# Patient Record
Sex: FEMALE | Race: WHITE
[De-identification: names, ages, dates, MRNs, and addresses within clinical notes are randomized per-mention and may not be internally consistent; named-entity substitution may affect disease eponyms.]

---

## 2019-04-19 ENCOUNTER — HOSPITAL ENCOUNTER (INPATIENT)
Dept: HOSPITAL 96 - M.ERS | Age: 69
LOS: 4 days | DRG: 871 | End: 2019-04-23
Attending: INTERNAL MEDICINE | Admitting: INTERNAL MEDICINE
Payer: COMMERCIAL

## 2019-04-19 VITALS — DIASTOLIC BLOOD PRESSURE: 72 MMHG | SYSTOLIC BLOOD PRESSURE: 108 MMHG

## 2019-04-19 VITALS — DIASTOLIC BLOOD PRESSURE: 80 MMHG | SYSTOLIC BLOOD PRESSURE: 111 MMHG

## 2019-04-19 VITALS — SYSTOLIC BLOOD PRESSURE: 107 MMHG | DIASTOLIC BLOOD PRESSURE: 77 MMHG

## 2019-04-19 VITALS — SYSTOLIC BLOOD PRESSURE: 106 MMHG | DIASTOLIC BLOOD PRESSURE: 71 MMHG

## 2019-04-19 VITALS — SYSTOLIC BLOOD PRESSURE: 121 MMHG | DIASTOLIC BLOOD PRESSURE: 78 MMHG

## 2019-04-19 VITALS — DIASTOLIC BLOOD PRESSURE: 85 MMHG | SYSTOLIC BLOOD PRESSURE: 119 MMHG

## 2019-04-19 VITALS — SYSTOLIC BLOOD PRESSURE: 115 MMHG | DIASTOLIC BLOOD PRESSURE: 75 MMHG

## 2019-04-19 VITALS — HEIGHT: 61 IN | WEIGHT: 115.3 LBS | BODY MASS INDEX: 21.77 KG/M2

## 2019-04-19 VITALS — DIASTOLIC BLOOD PRESSURE: 90 MMHG | SYSTOLIC BLOOD PRESSURE: 120 MMHG

## 2019-04-19 DIAGNOSIS — Z51.5: ICD-10-CM

## 2019-04-19 DIAGNOSIS — M19.90: ICD-10-CM

## 2019-04-19 DIAGNOSIS — F17.210: ICD-10-CM
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
## 2019-04-19 DIAGNOSIS — I11.0: ICD-10-CM

## 2019-04-19 DIAGNOSIS — J69.0: ICD-10-CM

## 2019-04-19 DIAGNOSIS — F10.239: ICD-10-CM

## 2019-04-19 DIAGNOSIS — Z85.21: ICD-10-CM

## 2019-04-19 DIAGNOSIS — A41.9: Primary | ICD-10-CM

## 2019-04-19 DIAGNOSIS — Z90.710: ICD-10-CM

## 2019-04-19 DIAGNOSIS — G43.909: ICD-10-CM

## 2019-04-19 DIAGNOSIS — Z79.82: ICD-10-CM

## 2019-04-19 DIAGNOSIS — Z90.49: ICD-10-CM

## 2019-04-19 DIAGNOSIS — I50.9: ICD-10-CM

## 2019-04-19 DIAGNOSIS — Z88.1: ICD-10-CM

## 2019-04-19 DIAGNOSIS — E43: ICD-10-CM

## 2019-04-19 DIAGNOSIS — R65.20: ICD-10-CM

## 2019-04-19 DIAGNOSIS — H91.90: ICD-10-CM

## 2019-04-19 DIAGNOSIS — J96.02: ICD-10-CM

## 2019-04-19 DIAGNOSIS — J96.01: ICD-10-CM

## 2019-04-19 DIAGNOSIS — L98.9: ICD-10-CM

## 2019-04-19 DIAGNOSIS — Z86.73: ICD-10-CM

## 2019-04-19 DIAGNOSIS — I21.4: ICD-10-CM

## 2019-04-19 DIAGNOSIS — J43.9: ICD-10-CM

## 2019-04-19 DIAGNOSIS — F10.20: ICD-10-CM

## 2019-04-19 LAB
ABSOLUTE MONOCYTES: 0.7 THOU/UL (ref 0–1.2)
ALBUMIN SERPL-MCNC: 3.1 G/DL (ref 3.4–5)
ALP SERPL-CCNC: 135 U/L (ref 46–116)
ALT SERPL-CCNC: 42 U/L (ref 30–65)
ANION GAP SERPL CALC-SCNC: 15 MMOL/L (ref 7–16)
APAP SERPL-MCNC: < 2 UG/ML (ref 10–30)
APTT BLD: 28 SECONDS (ref 25–31.3)
AST SERPL-CCNC: 67 U/L (ref 15–37)
BE: -11.3 MMOL/L
BE: -12.9 MMOL/L
BILIRUB SERPL-MCNC: 0.3 MG/DL
BUN SERPL-MCNC: 10 MG/DL (ref 7–18)
CALCIUM SERPL-MCNC: 8.9 MG/DL (ref 8.5–10.1)
CHLORIDE SERPL-SCNC: 98 MMOL/L (ref 98–107)
CO2 SERPL-SCNC: 20 MMOL/L (ref 21–32)
CREAT SERPL-MCNC: 1 MG/DL (ref 0.6–1.3)
ETHANOL SERPL-MCNC: 27 MG/DL (ref ?–10)
GLUCOSE SERPL-MCNC: 223 MG/DL (ref 70–99)
GRANULOCYTES NFR BLD MANUAL: 86 %
HCT VFR BLD CALC: 42.8 % (ref 37–47)
HGB BLD-MCNC: 14.1 GM/DL (ref 12–15)
INR PPP: 1.1
LYMPHOCYTES # BLD: 0.7 THOU/UL (ref 0.8–5.3)
LYMPHOCYTES NFR BLD AUTO: 4 %
MCH RBC QN AUTO: 33.8 PG (ref 26–34)
MCHC RBC AUTO-ENTMCNC: 33 G/DL (ref 28–37)
MCV RBC: 102.2 FL (ref 80–100)
MONOCYTES NFR BLD: 4 %
MPV: 6.7 FL. (ref 7.2–11.1)
NEUTROPHILS # BLD: 17.2 THOU/UL (ref 1.6–8.1)
NEUTS BAND NFR BLD: 6 %
NT-PRO BRAIN NAT PEPTIDE: 785 PG/ML (ref ?–300)
NUCLEATED RBCS: 0 /100WBC
PCO2 BLD: 44.5 MMHG (ref 35–45)
PCO2 BLD: 55.2 MMHG (ref 35–45)
PLATELET # BLD EST: ADEQUATE 10*3/UL
PLATELET COUNT*: 356 THOU/UL (ref 150–400)
PO2 BLD: 102.1 MMHG (ref 75–100)
PO2 BLD: 85.9 MMHG (ref 75–100)
POTASSIUM SERPL-SCNC: 4.8 MMOL/L (ref 3.5–5.1)
PROT SERPL-MCNC: 7.1 G/DL (ref 6.4–8.2)
PROTHROMBIN TIME: 11.5 SECONDS (ref 9.2–11.5)
RBC # BLD AUTO: 4.19 MIL/UL (ref 4.2–5)
RBC MORPH BLD: NORMAL
RDW-CV: 12.9 % (ref 10.5–14.5)
SALICYLATES SERPL-MCNC: 3.2 MG/DL (ref 2.8–20)
SODIUM SERPL-SCNC: 133 MMOL/L (ref 136–145)
TROPONIN-I LEVEL: 1.46 NG/ML (ref ?–0.06)
WBC # BLD AUTO: 18.7 THOU/UL (ref 4–11)

## 2019-04-19 NOTE — NUR
CALLED AND UPDATED DR RUBI RECENT ABG RESULTS, CLARIFIED HEPARIN BOLUS PER
PRTOCOL TO BE 2730UNITS IV BOLUS. GIVE ONE AMP BICARB IVP, DC NS IV FLUID,
BEGIN SODIUM BICARB GTT IV, BIPAP 12/5, REPEAT ABGIN 4 HOURS, REPEAT BMP, MAG
AND TROP IN 4 HOURS, WILL COMMUNICATE NEW ORDERS WITH PATIENT AND INITATE AS
ORDERED.

## 2019-04-19 NOTE — NUR
THIS NURSE ATTEMPTED SECOND IV LINE. UNABLE TO OBTAIN ACCESS. IV X2 BLOWN.
GAUZE AND BANDAID APPLIED TO BOTH SITES. WARM BLANKETS WRAPPED AROUND ARMS.

## 2019-04-19 NOTE — NUR
PT BACK FROM CT SCAN VIA STRETCHER BY THIS NURSE. DR. KAMINSKI STATES NEED TO
INTUBATE. PT INFORMED, PT REFUSING INTUBATION. PT A&O X4. HOSPITALIST AT
BEDSIDE STATING TO HOLD OFF ON INTUBATION. PT BREATHING LABORED AND SHALLOW. PT
ASKING FOR INHALER. PT ASKED VIA WRITTEN QUESTION IF SHE WOULD WANT STAFF TO
PUMP ON CHEST IF HER HEART STOPPED, PT STATED "YEAH". WITNESSED BY WALT STORY.

## 2019-04-19 NOTE — NUR
CRITICAL SERUM TROP LEVEL 2.12, HAS BEEN TREDING UP SINCE ARRIVAL TO HOSPITAL.
CALLED AND SPOKE WITH DR RUBI, COMMUNICATED TRENDING UP SERUM TROPONIN
LEVELS, WITH LAST RESULT 2.12, NEW ORDERS OBTAINED HEPARIN 5000UNITS BOLUS IV,
BEGIN HEPARIN CARDIOLOGY GTT PER PROTOCOL, START PPN 40CC/HR DECRESE NS TO
60CC/HR AND ATIVAN 1MG IVP [id number] PRN FOR AGGITATION. AWAITING 2000 ABG TO BE
DRAWN, PER DR RUBI REQUEST WILL NOTIFIY AGAIN WITH ABG RESULTS.

## 2019-04-19 NOTE — NUR
PT STEP DAUGHTER JEZ CALLED. PT STATED IT WAS OKAY TO GIVE JEZ INFORMATION
ABOUT PT CARE. JEZ REPORTS THAT PT LIVES IN [facility] AND TODAY WHEN JEZ WENT
TO DROP MAIL OFF WITH PT. JEZ FOUND THE PT AT THE BOTTOM OF THE STAIRS. PT
TOLD JEZ THAT SHE WAS HAVING A STROKE, SO JEZ CALLED THE AMBULANCE. JEZ
REPORTS THAT THERE WAS "A LOT OF BEER CANS ALL OVER THE PLACE". JEZ REPORTS
THAT PT HAS POOR LIVING CONDITIONS AND DOES NOT WASH SELF AS NECESSARY. JEZ
STATED THAT HER AND HER  WILL COME VISIT LATER.

## 2019-04-19 NOTE — NUR
NURSE TO NURSE REPORT GIVEN TO AWLT CHAVEZ IN ICU. SCOTT TO ASSUME PT CARE. 
PT TO BE TRANSFERRED TO ICU
BED 1 VIA STRETCHER.

## 2019-04-19 NOTE — NUR
PT MOVED TO ROOM 1 IN ER FROM ROOM 15 IN ER. WHEN PT ARRIVED TO ROOM 1, BEAR
HUGGER WAS PLACED DUE TO TEMPORAL TEMP RECORDED AT 95 F.

## 2019-04-19 NOTE — NUR
PT ARRIVED FROM ER AROUND 1700 TO ICU RM 1. ASSESSMENT AS CHARTED. PT HAS
WARMING BLANKET ON WITH TEMP FONSECA PROBE THAT READS IN 94.8 F. PT Cahto AND NOT
ABLE TO ANSWER QUESTIONS. WILL CONTINUE TO MONITOR.

## 2019-04-20 VITALS — DIASTOLIC BLOOD PRESSURE: 77 MMHG | SYSTOLIC BLOOD PRESSURE: 123 MMHG

## 2019-04-20 VITALS — DIASTOLIC BLOOD PRESSURE: 59 MMHG | SYSTOLIC BLOOD PRESSURE: 90 MMHG

## 2019-04-20 VITALS — SYSTOLIC BLOOD PRESSURE: 91 MMHG | DIASTOLIC BLOOD PRESSURE: 55 MMHG

## 2019-04-20 VITALS — SYSTOLIC BLOOD PRESSURE: 80 MMHG | DIASTOLIC BLOOD PRESSURE: 46 MMHG

## 2019-04-20 VITALS — SYSTOLIC BLOOD PRESSURE: 82 MMHG | DIASTOLIC BLOOD PRESSURE: 52 MMHG

## 2019-04-20 VITALS — DIASTOLIC BLOOD PRESSURE: 75 MMHG | SYSTOLIC BLOOD PRESSURE: 118 MMHG

## 2019-04-20 VITALS — SYSTOLIC BLOOD PRESSURE: 104 MMHG | DIASTOLIC BLOOD PRESSURE: 64 MMHG

## 2019-04-20 VITALS — DIASTOLIC BLOOD PRESSURE: 94 MMHG | SYSTOLIC BLOOD PRESSURE: 128 MMHG

## 2019-04-20 VITALS — DIASTOLIC BLOOD PRESSURE: 76 MMHG | SYSTOLIC BLOOD PRESSURE: 110 MMHG

## 2019-04-20 VITALS — DIASTOLIC BLOOD PRESSURE: 45 MMHG | SYSTOLIC BLOOD PRESSURE: 78 MMHG

## 2019-04-20 VITALS — SYSTOLIC BLOOD PRESSURE: 113 MMHG | DIASTOLIC BLOOD PRESSURE: 73 MMHG

## 2019-04-20 VITALS — DIASTOLIC BLOOD PRESSURE: 56 MMHG | SYSTOLIC BLOOD PRESSURE: 89 MMHG

## 2019-04-20 VITALS — DIASTOLIC BLOOD PRESSURE: 133 MMHG | SYSTOLIC BLOOD PRESSURE: 157 MMHG

## 2019-04-20 VITALS — DIASTOLIC BLOOD PRESSURE: 81 MMHG | SYSTOLIC BLOOD PRESSURE: 109 MMHG

## 2019-04-20 VITALS — SYSTOLIC BLOOD PRESSURE: 91 MMHG | DIASTOLIC BLOOD PRESSURE: 54 MMHG

## 2019-04-20 VITALS — SYSTOLIC BLOOD PRESSURE: 93 MMHG | DIASTOLIC BLOOD PRESSURE: 28 MMHG

## 2019-04-20 VITALS — SYSTOLIC BLOOD PRESSURE: 157 MMHG | DIASTOLIC BLOOD PRESSURE: 75 MMHG

## 2019-04-20 VITALS — SYSTOLIC BLOOD PRESSURE: 132 MMHG | DIASTOLIC BLOOD PRESSURE: 90 MMHG

## 2019-04-20 VITALS — SYSTOLIC BLOOD PRESSURE: 98 MMHG | DIASTOLIC BLOOD PRESSURE: 73 MMHG

## 2019-04-20 VITALS — SYSTOLIC BLOOD PRESSURE: 105 MMHG | DIASTOLIC BLOOD PRESSURE: 65 MMHG

## 2019-04-20 VITALS — SYSTOLIC BLOOD PRESSURE: 97 MMHG | DIASTOLIC BLOOD PRESSURE: 61 MMHG

## 2019-04-20 VITALS — SYSTOLIC BLOOD PRESSURE: 129 MMHG | DIASTOLIC BLOOD PRESSURE: 99 MMHG

## 2019-04-20 VITALS — DIASTOLIC BLOOD PRESSURE: 61 MMHG | SYSTOLIC BLOOD PRESSURE: 99 MMHG

## 2019-04-20 VITALS — SYSTOLIC BLOOD PRESSURE: 93 MMHG | DIASTOLIC BLOOD PRESSURE: 57 MMHG

## 2019-04-20 VITALS — SYSTOLIC BLOOD PRESSURE: 91 MMHG | DIASTOLIC BLOOD PRESSURE: 52 MMHG

## 2019-04-20 VITALS — DIASTOLIC BLOOD PRESSURE: 56 MMHG | SYSTOLIC BLOOD PRESSURE: 94 MMHG

## 2019-04-20 VITALS — SYSTOLIC BLOOD PRESSURE: 96 MMHG | DIASTOLIC BLOOD PRESSURE: 58 MMHG

## 2019-04-20 VITALS — DIASTOLIC BLOOD PRESSURE: 96 MMHG | SYSTOLIC BLOOD PRESSURE: 134 MMHG

## 2019-04-20 VITALS — SYSTOLIC BLOOD PRESSURE: 105 MMHG | DIASTOLIC BLOOD PRESSURE: 69 MMHG

## 2019-04-20 VITALS — DIASTOLIC BLOOD PRESSURE: 74 MMHG | SYSTOLIC BLOOD PRESSURE: 109 MMHG

## 2019-04-20 VITALS — SYSTOLIC BLOOD PRESSURE: 85 MMHG | DIASTOLIC BLOOD PRESSURE: 57 MMHG

## 2019-04-20 VITALS — DIASTOLIC BLOOD PRESSURE: 89 MMHG | SYSTOLIC BLOOD PRESSURE: 139 MMHG

## 2019-04-20 VITALS — SYSTOLIC BLOOD PRESSURE: 98 MMHG | DIASTOLIC BLOOD PRESSURE: 68 MMHG

## 2019-04-20 VITALS — SYSTOLIC BLOOD PRESSURE: 123 MMHG | DIASTOLIC BLOOD PRESSURE: 90 MMHG

## 2019-04-20 VITALS — SYSTOLIC BLOOD PRESSURE: 119 MMHG | DIASTOLIC BLOOD PRESSURE: 76 MMHG

## 2019-04-20 VITALS — SYSTOLIC BLOOD PRESSURE: 89 MMHG | DIASTOLIC BLOOD PRESSURE: 67 MMHG

## 2019-04-20 VITALS — SYSTOLIC BLOOD PRESSURE: 97 MMHG | DIASTOLIC BLOOD PRESSURE: 45 MMHG

## 2019-04-20 VITALS — DIASTOLIC BLOOD PRESSURE: 96 MMHG | SYSTOLIC BLOOD PRESSURE: 125 MMHG

## 2019-04-20 VITALS — SYSTOLIC BLOOD PRESSURE: 95 MMHG | DIASTOLIC BLOOD PRESSURE: 53 MMHG

## 2019-04-20 VITALS — DIASTOLIC BLOOD PRESSURE: 50 MMHG | SYSTOLIC BLOOD PRESSURE: 83 MMHG

## 2019-04-20 VITALS — SYSTOLIC BLOOD PRESSURE: 117 MMHG | DIASTOLIC BLOOD PRESSURE: 78 MMHG

## 2019-04-20 VITALS — DIASTOLIC BLOOD PRESSURE: 43 MMHG | SYSTOLIC BLOOD PRESSURE: 73 MMHG

## 2019-04-20 LAB
ABSOLUTE BASOPHILS: 0 THOU/UL (ref 0–0.2)
ABSOLUTE EOSINOPHILS: 0 THOU/UL (ref 0–0.7)
ABSOLUTE MONOCYTES: 0.8 THOU/UL (ref 0–1.2)
ALBUMIN SERPL-MCNC: 2.6 G/DL (ref 3.4–5)
ALP SERPL-CCNC: 75 U/L (ref 46–116)
ALT SERPL-CCNC: 59 U/L (ref 30–65)
ANION GAP SERPL CALC-SCNC: 10 MMOL/L (ref 7–16)
ANION GAP SERPL CALC-SCNC: 9 MMOL/L (ref 7–16)
AST SERPL-CCNC: 200 U/L (ref 15–37)
BASOPHILS NFR BLD AUTO: 0.3 %
BE: -1.2 MMOL/L
BE: -1.8 MMOL/L
BE: -3.6 MMOL/L
BE: -4.6 MMOL/L
BE: -7.9 MMOL/L
BILIRUB SERPL-MCNC: 0.6 MG/DL
BUN SERPL-MCNC: 22 MG/DL (ref 7–18)
BUN SERPL-MCNC: 28 MG/DL (ref 7–18)
CALCIUM SERPL-MCNC: 8.1 MG/DL (ref 8.5–10.1)
CALCIUM SERPL-MCNC: 8.2 MG/DL (ref 8.5–10.1)
CHLORIDE SERPL-SCNC: 103 MMOL/L (ref 98–107)
CHLORIDE SERPL-SCNC: 103 MMOL/L (ref 98–107)
CO2 SERPL-SCNC: 24 MMOL/L (ref 21–32)
CO2 SERPL-SCNC: 26 MMOL/L (ref 21–32)
CREAT SERPL-MCNC: 0.9 MG/DL (ref 0.6–1.3)
CREAT SERPL-MCNC: 1.1 MG/DL (ref 0.6–1.3)
EOSINOPHIL NFR BLD: 0 %
GLUCOSE SERPL-MCNC: 192 MG/DL (ref 70–99)
GLUCOSE SERPL-MCNC: 199 MG/DL (ref 70–99)
GRANULOCYTES NFR BLD MANUAL: 88 %
HCT VFR BLD CALC: 38.7 % (ref 37–47)
HGB BLD-MCNC: 13 GM/DL (ref 12–15)
LYMPHOCYTES # BLD: 0.6 THOU/UL (ref 0.8–5.3)
LYMPHOCYTES NFR BLD AUTO: 4.8 %
MAGNESIUM SERPL-MCNC: 2.2 MG/DL (ref 1.8–2.4)
MAGNESIUM SERPL-MCNC: 2.3 MG/DL (ref 1.8–2.4)
MCH RBC QN AUTO: 34.1 PG (ref 26–34)
MCHC RBC AUTO-ENTMCNC: 33.5 G/DL (ref 28–37)
MCV RBC: 101.6 FL (ref 80–100)
MONOCYTES NFR BLD: 6.9 %
MPV: 7.7 FL. (ref 7.2–11.1)
NEUTROPHILS # BLD: 10.7 THOU/UL (ref 1.6–8.1)
NUCLEATED RBCS: 0 /100WBC
OPIATES UR-MCNC: POSITIVE NG/ML
PCO2 BLD: 43.1 MMHG (ref 35–45)
PCO2 BLD: 50.5 MMHG (ref 35–45)
PCO2 BLD: 55.2 MMHG (ref 35–45)
PCO2 BLD: 68 MMHG (ref 35–45)
PCO2 BLD: 77 MMHG (ref 35–45)
PLATELET COUNT*: 244 THOU/UL (ref 150–400)
PO2 BLD: 152.5 MMHG (ref 75–100)
PO2 BLD: 215.7 MMHG (ref 75–100)
PO2 BLD: 69.6 MMHG (ref 75–100)
PO2 BLD: 78.1 MMHG (ref 75–100)
PO2 BLD: 79.4 MMHG (ref 75–100)
POTASSIUM SERPL-SCNC: 4.4 MMOL/L (ref 3.5–5.1)
POTASSIUM SERPL-SCNC: 5.6 MMOL/L (ref 3.5–5.1)
PROT SERPL-MCNC: 5.8 G/DL (ref 6.4–8.2)
RBC # BLD AUTO: 3.81 MIL/UL (ref 4.2–5)
RDW-CV: 12.7 % (ref 10.5–14.5)
SODIUM SERPL-SCNC: 136 MMOL/L (ref 136–145)
SODIUM SERPL-SCNC: 139 MMOL/L (ref 136–145)
TROPONIN-I LEVEL: 3.59 NG/ML (ref ?–0.06)
WBC # BLD AUTO: 12.2 THOU/UL (ref 4–11)

## 2019-04-20 PROCEDURE — 5A1945Z RESPIRATORY VENTILATION, 24-96 CONSECUTIVE HOURS: ICD-10-PCS | Performed by: INTERNAL MEDICINE

## 2019-04-20 PROCEDURE — 02HV33Z INSERTION OF INFUSION DEVICE INTO SUPERIOR VENA CAVA, PERCUTANEOUS APPROACH: ICD-10-PCS | Performed by: INTERNAL MEDICINE

## 2019-04-20 PROCEDURE — 5A09357 ASSISTANCE WITH RESPIRATORY VENTILATION, LESS THAN 24 CONSECUTIVE HOURS, CONTINUOUS POSITIVE AIRWAY PRESSURE: ICD-10-PCS | Performed by: INTERNAL MEDICINE

## 2019-04-20 PROCEDURE — 0BH17EZ INSERTION OF ENDOTRACHEAL AIRWAY INTO TRACHEA, VIA NATURAL OR ARTIFICIAL OPENING: ICD-10-PCS | Performed by: INTERNAL MEDICINE

## 2019-04-20 NOTE — NUR
PROGRESSING TOWARDS GOALS, SEE COMPUTERIZED ASSESSMENT DOCUMENTATION PLEASE.
ABG'S IMPROVING, REMAINS ON NA BICARB GTT 60CC/HR AND BIPAP FIO2 50%, SAO2
=>97%, YANNICK HUGGER REMOVED ONCE CORE TEMP OBTAINED AT 97.0, ABLE TO MAINTAIN
TEMP REST OF NOC, CORE TEMP 99.0 AT THIS TIME, REMAINS ON HEPARIN GTT WITHOUT
S/S OF ACTIVE BLEEDING NOTED, DENIES CP OR DISCOMFORT, REMAINS NSR/ST TRACING
CARDIAC MONITOR, ATIVAN GIVEN X1 AND HELPFUL FOR RELAXATION, REMAINS
TACHYPENIC RESP HIGH 20'S TO HIGH 30'S, REPOSITIONED Q2 AND PRN TO PROMOTE
SKIN INTEGRITY AND COMFORT, BED ALARM ON FOR SAFETY, BED REMAINS IN LOW AND
LOCKED POSITION.

## 2019-04-20 NOTE — NUR
APTT 59.6, IN THERAPEUTIC RANGE, NEW APTT ENTERED IN COMPUTER FOR 0/21/19 AT
0300. NO S/S OF ACTIVE BLEEDING NOTED.

## 2019-04-20 NOTE — NUR
PATIENT REMAINS ON BIPAP WITH O2 SAT AT 94%. NOW NSR ON CARDIAC MONITOR.
ELEVATED TROPONINS READ TO DR LACEY ON FLOOR. ORDERS FOR REPEAT TROPONIN AT
1900 AND EKG IN AM. REMAINS ON PRECEDEX AT 1.4, 2X DOSES OF ATIVAN GIVEN
TODAY. PATIENT'S DAUGHTER BROUGHT DPOA/ADVANCED DIRECTIVE. PATIENT'S 
 IS PRIMARY DPOA, BUT SECONDARY DPOA IS STEP-SON. ADVANCED DIRECTIVE
REPORTS THAT SHE WOULD LIKE NO LIFE PROLONGING INTERVENTIONS, BUT CURRENTLY
SHE IS SAYING SHE WOULD BE OKAY WITH INTUBATION. PER HOSPITALIST FULL CODE
ORDERED. PATIENT WAS ON HOSPICE AFTER LAST STAY IN THIS HOSPITAL PER FAMILY,
BUT DISCONTINUED IT HERSELF. SHE CURRENTLY LIVES IN HER OWN HOME. HER STEP
DAUGHTER AND STEP SON IN LAW LIVE THREE HOUSES DOWN AND BRING HER
FOOD, BUT SHE REQUESTS THEM TO BRING CIGARETTES AND BEER. PER STEP DAUGHTER
SHE DRINKS 5-6 16 OUNCE BEERS DAILY AND 2 CARTONS CIGARETTES A WEEK. PATIENT
SMOKES IN THE GARAGE WHERE SHE WAS FOUND TO HAVE FALLEN. PATIENT UNKEMPT AND
COVERED IN A LAYER OF DIRT. BATH GIVEN TODAY, BUT UNABLE TO GET MOST OF THE
GRIME OFF. PATIENT DOES NOT GO TO DOCTOR'S VISITS EXCEPT TO GET HER INHALER
SCRIPT. SHE HAS HAD TROUBLE SWALLOING, BUT DOES NOT LET THE PHYSICIAN
EVALUATE. SHE HAS BEEN WRITING ON PAPER TO COMMUNICATE WITH FAMILY R/T HOARSE
VOICE. SHE ALSO HAS CANCEROUS-LIKE LESION TO FOREHEAD, HER FAMILY STATES SHE
HAS HAD FOR 3 YEARS, BUT HAS NOT LET PHYSICIAN EVALUATE AS WELL. PATIENT
FAMILY APPEARS COOPERATIVE WITH [facility]. VOICES NO FURTHER QUESTIONS AT THIS
TIME.

## 2019-04-20 NOTE — NUR
PATIENT WORK OF BREATHING WORSENING. PULMONARY PAGED. DR PORTER GAVE ORDERS FOR
INTUBATION. ETOMIDATE AT 1859, SUCC AT 1859. INTUBATED AT 1900. COLOR CHANGE
NOTED. PER DR PORTER, VENT SETTINGS AC 14, , PEEP 5, FIO2 100%. ABG TO BE
COMPLETED 30 MINUTES AFTER. CXR CONFIRMED PLACEMENT. NG PLACED LEFT NARE. KUB
PENDING. VEC GIVEN AT 1900. VERSED GTT STARTED. LEVOPHED AS NEEDED. PATIENT'S
STEP-DAUGHTER NOTIFIED. SHE STATED THAT SHE BROUGHT THE ADVANCED DIRECTIVE
THAT STATED PATIENT WAS A DNR. PATIENT VERBALIZED TO PHYSICIAN THAT SHE WOULD
WANT A "TUBE DOWN HER THROAT" IF HER BREATHING WORSENED AND PHYSICIAN HAD
ORDERED FULL CODE. DID HAVE EXTENSIVE CONVERSATION WITH THE STEP-DAUGHTER
EARLIER THAT PATIENT WAS ORIENTED AND "DRIVING THE SHIP" AT THIS MOMENT.

## 2019-04-21 VITALS — SYSTOLIC BLOOD PRESSURE: 116 MMHG | DIASTOLIC BLOOD PRESSURE: 77 MMHG

## 2019-04-21 VITALS — SYSTOLIC BLOOD PRESSURE: 126 MMHG | DIASTOLIC BLOOD PRESSURE: 81 MMHG

## 2019-04-21 VITALS — SYSTOLIC BLOOD PRESSURE: 113 MMHG | DIASTOLIC BLOOD PRESSURE: 72 MMHG

## 2019-04-21 VITALS — SYSTOLIC BLOOD PRESSURE: 110 MMHG | DIASTOLIC BLOOD PRESSURE: 73 MMHG

## 2019-04-21 VITALS — DIASTOLIC BLOOD PRESSURE: 65 MMHG | SYSTOLIC BLOOD PRESSURE: 109 MMHG

## 2019-04-21 VITALS — DIASTOLIC BLOOD PRESSURE: 72 MMHG | SYSTOLIC BLOOD PRESSURE: 113 MMHG

## 2019-04-21 VITALS — DIASTOLIC BLOOD PRESSURE: 73 MMHG | SYSTOLIC BLOOD PRESSURE: 110 MMHG

## 2019-04-21 VITALS — SYSTOLIC BLOOD PRESSURE: 79 MMHG | DIASTOLIC BLOOD PRESSURE: 53 MMHG

## 2019-04-21 VITALS — SYSTOLIC BLOOD PRESSURE: 113 MMHG | DIASTOLIC BLOOD PRESSURE: 73 MMHG

## 2019-04-21 VITALS — DIASTOLIC BLOOD PRESSURE: 71 MMHG | SYSTOLIC BLOOD PRESSURE: 111 MMHG

## 2019-04-21 VITALS — DIASTOLIC BLOOD PRESSURE: 70 MMHG | SYSTOLIC BLOOD PRESSURE: 113 MMHG

## 2019-04-21 VITALS — DIASTOLIC BLOOD PRESSURE: 70 MMHG | SYSTOLIC BLOOD PRESSURE: 114 MMHG

## 2019-04-21 VITALS — DIASTOLIC BLOOD PRESSURE: 68 MMHG | SYSTOLIC BLOOD PRESSURE: 109 MMHG

## 2019-04-21 VITALS — DIASTOLIC BLOOD PRESSURE: 69 MMHG | SYSTOLIC BLOOD PRESSURE: 110 MMHG

## 2019-04-21 VITALS — SYSTOLIC BLOOD PRESSURE: 119 MMHG | DIASTOLIC BLOOD PRESSURE: 69 MMHG

## 2019-04-21 VITALS — DIASTOLIC BLOOD PRESSURE: 85 MMHG | SYSTOLIC BLOOD PRESSURE: 129 MMHG

## 2019-04-21 VITALS — SYSTOLIC BLOOD PRESSURE: 89 MMHG | DIASTOLIC BLOOD PRESSURE: 62 MMHG

## 2019-04-21 VITALS — DIASTOLIC BLOOD PRESSURE: 75 MMHG | SYSTOLIC BLOOD PRESSURE: 117 MMHG

## 2019-04-21 VITALS — DIASTOLIC BLOOD PRESSURE: 59 MMHG | SYSTOLIC BLOOD PRESSURE: 88 MMHG

## 2019-04-21 VITALS — SYSTOLIC BLOOD PRESSURE: 131 MMHG | DIASTOLIC BLOOD PRESSURE: 83 MMHG

## 2019-04-21 VITALS — SYSTOLIC BLOOD PRESSURE: 109 MMHG | DIASTOLIC BLOOD PRESSURE: 68 MMHG

## 2019-04-21 VITALS — DIASTOLIC BLOOD PRESSURE: 51 MMHG | SYSTOLIC BLOOD PRESSURE: 80 MMHG

## 2019-04-21 VITALS — SYSTOLIC BLOOD PRESSURE: 118 MMHG | DIASTOLIC BLOOD PRESSURE: 76 MMHG

## 2019-04-21 VITALS — DIASTOLIC BLOOD PRESSURE: 51 MMHG | SYSTOLIC BLOOD PRESSURE: 93 MMHG

## 2019-04-21 VITALS — SYSTOLIC BLOOD PRESSURE: 92 MMHG | DIASTOLIC BLOOD PRESSURE: 64 MMHG

## 2019-04-21 VITALS — SYSTOLIC BLOOD PRESSURE: 112 MMHG | DIASTOLIC BLOOD PRESSURE: 70 MMHG

## 2019-04-21 VITALS — DIASTOLIC BLOOD PRESSURE: 70 MMHG | SYSTOLIC BLOOD PRESSURE: 110 MMHG

## 2019-04-21 VITALS — SYSTOLIC BLOOD PRESSURE: 131 MMHG | DIASTOLIC BLOOD PRESSURE: 82 MMHG

## 2019-04-21 VITALS — SYSTOLIC BLOOD PRESSURE: 123 MMHG | DIASTOLIC BLOOD PRESSURE: 79 MMHG

## 2019-04-21 VITALS — DIASTOLIC BLOOD PRESSURE: 78 MMHG | SYSTOLIC BLOOD PRESSURE: 121 MMHG

## 2019-04-21 VITALS — SYSTOLIC BLOOD PRESSURE: 112 MMHG | DIASTOLIC BLOOD PRESSURE: 73 MMHG

## 2019-04-21 VITALS — SYSTOLIC BLOOD PRESSURE: 75 MMHG | DIASTOLIC BLOOD PRESSURE: 49 MMHG

## 2019-04-21 VITALS — SYSTOLIC BLOOD PRESSURE: 100 MMHG | DIASTOLIC BLOOD PRESSURE: 73 MMHG

## 2019-04-21 VITALS — DIASTOLIC BLOOD PRESSURE: 49 MMHG | SYSTOLIC BLOOD PRESSURE: 74 MMHG

## 2019-04-21 VITALS — SYSTOLIC BLOOD PRESSURE: 90 MMHG | DIASTOLIC BLOOD PRESSURE: 57 MMHG

## 2019-04-21 VITALS — SYSTOLIC BLOOD PRESSURE: 77 MMHG | DIASTOLIC BLOOD PRESSURE: 49 MMHG

## 2019-04-21 VITALS — SYSTOLIC BLOOD PRESSURE: 114 MMHG | DIASTOLIC BLOOD PRESSURE: 72 MMHG

## 2019-04-21 VITALS — SYSTOLIC BLOOD PRESSURE: 118 MMHG | DIASTOLIC BLOOD PRESSURE: 74 MMHG

## 2019-04-21 LAB
ANION GAP SERPL CALC-SCNC: 5 MMOL/L (ref 7–16)
BE: -1.1 MMOL/L
BUN SERPL-MCNC: 34 MG/DL (ref 7–18)
CALCIUM SERPL-MCNC: 7.6 MG/DL (ref 8.5–10.1)
CHLORIDE SERPL-SCNC: 107 MMOL/L (ref 98–107)
CO2 SERPL-SCNC: 29 MMOL/L (ref 21–32)
CREAT SERPL-MCNC: 0.7 MG/DL (ref 0.6–1.3)
GLUCOSE SERPL-MCNC: 160 MG/DL (ref 70–99)
HCT VFR BLD CALC: 33.5 % (ref 37–47)
HGB BLD-MCNC: 11.2 GM/DL (ref 12–15)
MCH RBC QN AUTO: 34.3 PG (ref 26–34)
MCHC RBC AUTO-ENTMCNC: 33.5 G/DL (ref 28–37)
MCV RBC: 102.2 FL (ref 80–100)
MPV: 7.5 FL. (ref 7.2–11.1)
PCO2 BLD: 51 MMHG (ref 35–45)
PLATELET COUNT*: 222 THOU/UL (ref 150–400)
PO2 BLD: 146.3 MMHG (ref 75–100)
POTASSIUM SERPL-SCNC: 4.2 MMOL/L (ref 3.5–5.1)
RBC # BLD AUTO: 3.28 MIL/UL (ref 4.2–5)
RDW-CV: 12.6 % (ref 10.5–14.5)
SODIUM SERPL-SCNC: 141 MMOL/L (ref 136–145)
WBC # BLD AUTO: 7.6 THOU/UL (ref 4–11)

## 2019-04-21 NOTE — EKG
[place] [address]
Phone:  [phone]                     ELECTROCARDIOGRAM REPORT      
_______________________________________________________________________________
 
Name:       MARELY WHITE           Room:           [address]    ADM IN  
M.R.#:  [id number]      Account #:      [id number]  
Admission:  19     Attend Phys:    DHARMESH Moreno
Discharge:               Date of Birth:  [birth year]  
         Report #: [id number]
    [id number]
_______________________________________________________________________________
THIS REPORT FOR:  //name//                      
 
                          [facility]
                                       
Test Date:    2019               Test Time:    07:45:10
Pat Name:     MARELY BYRON ALBRIGHT       Department:   
Patient ID:   [id number]            Room:         [address]
Gender:       F                        Technician:   JAMES
:          1950               Requested By: Joe Wray
Order Number: [id number]    Reading MD:   Joe Wray
                                 Measurements
Intervals                              Axis          
Rate:         80                       P:            87
NH:           126                      QRS:          83
QRSD:         93                       T:            253
QT:           487                                    
QTc:          562                                    
                           Interpretive Statements
Sinus rhythm
Ventricular premature complex
Low voltage with right axis deviation
Abnormal T, probable ischemia, widespread
Prolonged QT interval
Compared to ECG 2016 13:01:04
Ventricular premature complex(es) now present
Right-axis deviation now present
T-wave abnormality now present
Possible ischemia now present
Prolonged QT interval now present
Sinus tachycardia no longer present
 
Electronically Signed On 2019 12:09:37 CDT by Joe Wray
[email or web address]
 
 
 
 
 
 
 
 
 
 
  <ELECTRONICALLY SIGNED>
                                           By: Joe Wray MD, [facility]   
  19     1209
D: 19 0745   _____________________________________
T: 19 0745   Joe Wray MD, [facility]     /EPI

## 2019-04-21 NOTE — EKG
[place] [address]
Phone:  [phone]                     ELECTROCARDIOGRAM REPORT      
_______________________________________________________________________________
 
Name:       MARELY WHITE           Room:           [address]    ADM IN  
M.R.#:  [id number]      Account #:      [id number]  
Admission:  19     Attend Phys:    DHARMESH Moreno
Discharge:               Date of Birth:  [birth year]  
         Report #: [id number]
    [id number]
_______________________________________________________________________________
THIS REPORT FOR:  //name//                      
 
                         [facility] ED
                                       
Test Date:    2019               Test Time:    14:04:47
Pat Name:     MARELYMARCOS ALBRIGHT       Department:   
Patient ID:   [id number]            Room:         [facility]
Gender:       F                        Technician:   RYLEE ELWIS
:          1950               Requested By: Zeferino Cabral
Order Number: [id number] MD:   Joe Wray
                                 Measurements
Intervals                              Axis          
Rate:         102                      P:            79
[place]:           154                      QRS:          47
QRSD:         100                      T:            237
QT:           345                                    
QTc:          450                                    
                           Interpretive Statements
Sinus tachycardia
Anterior infarct, old, possible
Nonspecific T abnormalities, inferior leads
Compared to ECG 2016 13:01:04
Myocardial infarct finding now present
T-wave abnormality now present
 
Electronically Signed On 2019 12:01:44 CDT by Joe Wray
[email or web address]
 
 
 
 
 
 
 
 
 
 
 
 
 
 
 
 
  <ELECTRONICALLY SIGNED>
                                           By: Joe Wray MD, FACC   
  19     1201
D: 19 1404   _____________________________________
T: 19 1404   Joe Wray MD, FAC     /EPI

## 2019-04-21 NOTE — NUR
PATIENT REMAINS INTUBATED AND SEDATED.  LEVO IS OFF. PROPOFOL AT 50 PRESIDEX
IS AT 1. FAMILY IN FOR CONFERENCE WITH DR RUBI PT TO BE GIVEN TIME TO
IMPROVE.

## 2019-04-21 NOTE — CON
[address]  [address]                    CONSULTATION                  
_______________________________________________________________________________
 
Name:       MARELY WHITE           Room:           [address] [place]  
[facility]#:  [id number]      Account #:      [id number]  
Admission:  04/19/19     Attend Phys:    DHARMESH Moreno
Discharge:               Date of Birth:  [birth year]  
         Report #: [id number]
                                                                     [id number]  
_______________________________________________________________________________
THIS REPORT FOR:  //name//                      
 
CC: Mirna Melinda Grover
 
DATE OF SERVICE:  04/20/2019
 
 
INDICATION:  Elevated troponin consistent with non-ST elevation myocardial
infarction.
 
HISTORY OF PRESENT ILLNESS:  The patient is a 69-year-old white female found by
her stepdaughter with altered mental status.  EMS was summoned.  Initial oxygen
saturation was in the 80s.  Current respiratory rate is 32.  The patient is in
obvious respiratory distress.  She has been placed on BiPAP.  She is currently
maintaining adequate O2 saturations.  Blood pressure is stable.  She is mildly
tachycardic.  She is not significantly responsive at this time.  History is not
obtainable from the patient.  According to family, the patient is a chronic
tobacco user.  She drinks alcohol daily.
 
PAST MEDICAL HISTORY:
1.  Alcohol abuse.
2.  Chronic obstructive pulmonary disease.
3.  Paroxysmal supraventricular tachycardia.
4.  Transient ischemic attack.
5.  Hysterectomy.
6.  Back problems.
7.  Pancreatitis.
8.  Cholecystectomy.
9.  Stroke in 03/2016.
 
ALLERGIES:  KEFLEX.
 
HOME MEDICATIONS:  Aspirin 81 mg daily.
 
FAMILY HISTORY:  Noncontributory.
 
SOCIAL HISTORY:  The patient apparently smokes every day.  She drinks every day.
 
REVIEW OF SYSTEMS:  Unobtainable.
 
PHYSICAL EXAMINATION:
VITAL SIGNS:  Blood pressure 129/99, pulse 114 and regular.
GENERAL:  This is a cachectic elderly appearing female in no obvious distress. 
She is lethargic and minimally responsive.
HEENT:  Normocephalic, atraumatic.  BiPAP mask in place.
 
 
 
[place] [address]                    CONSULTATION                  
_______________________________________________________________________________
 
Name:       MARELY WHITE           Room:           [address] [place]  
[facility]#:  [id number]      Account #:      [id number]  
Admission:  04/19/19     Attend Phys:    DHARMESH Moreno
Discharge:               Date of Birth:  [birth year]  
         Report #: [id number]
                                                                     [id number]  
_______________________________________________________________________________
NECK:  Shows no jugular venous distention.
CHEST:  Reveals diffusely decreased breath sounds throughout without rales.
CARDIOVASCULAR:  Reveals a tachycardic rhythm without obvious gallop or murmur.
ABDOMEN:  Scaphoid.  Bowel sounds present.  Abdomen is soft.
EXTREMITIES:  Shows slight ankle and tibial edema.
SKIN:  Exfoliating.
 
LABORATORY DATA:  A 12-lead EKG shows sinus tachycardia with nonspecific T-wave
inversion in the inferolateral leads.  There is delayed R-wave progression in
the anterior leads.
 
Chest x-ray shows normal cardiac silhouette.  There is hyperinflation consistent
with COPD.  I do not appreciate any signs to suggest CHF.
 
LABS:  Labs are reviewed.  Sodium 136, potassium 5.6, chloride 103, bicarbonate
24, BUN 22, creatinine 1.1, serum glucose 192, AST 67, lipase 170, total
bilirubin 0.3, direct bilirubin less than 0.1, calcium 8.2, phosphorus 4.5,
magnesium 2.2, alkaline phosphatase 135, ALT 42, total protein 7.1, albumin 3.1,
EGFR 50, lactic acid 2.6.  Troponin on arrival was 1.46 and presently 3.59. 
NT-proBNP 785.  White blood cell count 18.7, hemoglobin 14.1, platelet count
356,000.
 
IMPRESSION AND RECOMMENDATIONS:
1.  Non-ST elevation myocardial infarction.  The patient is presently
hemodynamically stable.  I would like to obtain an echocardiogram.  She has been
placed on a heparin drip.  We will try low dose beta blocker as tolerated.  Plan
to repeat troponin at this time to see if the troponin has peaked.  No further
aggressive treatment at this time.
2.  Chronic obstructive pulmonary disease per Pulmonology and primary physician.
3.  History of paroxysmal supraventricular tachycardia.  Rhythm appears stable
at this time.  No specific treatment.
4.  Possible sepsis per hospitalist.
 
At this point in time, I am obtaining an echocardiogram to evaluate cardiac
structure and function.  I would like to repeat troponins to see if there has
been a peak to this yet.  Continue heparin drip.
 
 
 
 
 
 
 
 
<ELECTRONICALLY SIGNED>
                                        By:  Joe Wray MD, FACC   
04/21/19     1213
D: 04/20/19 1138_______________________________________
T: 04/21/19 0518Joe Wray MD, FACC      /nt

## 2019-04-21 NOTE — NUR
APTT 74.5, HEPARIN GTT DECREASED 2UNITS/KG/HR VIA INFUSION PUMP, NO S/S ACTIVE
BLEEDING NOTED, APTT ORDER ENTERED FOR 0520,  FOLLOWING THE CARDIAC WEIGHT
BASED HEPARIN THERAPY STANDING ORDERS.

## 2019-04-21 NOTE — CON
[address]  [address]                    CONSULTATION                  
_______________________________________________________________________________
 
Name:       MARELY WHITE           Room:           [address] [place]  
.R.#:  [id number]      Account #:      [id number]  
Admission:  04/19/19     Attend Phys:    DHARMESH Moreno
Discharge:               Date of Birth:  [birth year]  
         Report #: [id number]
                                                                     [id number]  
_______________________________________________________________________________
THIS REPORT FOR:  //name//                      
 
CC: Mirna Melinda Grover
 
DATE OF SERVICE:  04/20/2019
 
 
REASON FOR EVALUATION:  Acute respiratory failure, COPD with exacerbation,
aspiration pneumonia.
 
REFERRING PHYSICIAN:  Monique Grover MD.
 
HISTORY OF PRESENT ILLNESS:  A 69-year-old woman with history of COPD, severe
history of alcohol abuse.  She was found down in the garage by her family,
period of unknown duration, and a history of alcohol abuse.  When EMS arrived,
she was extremely hypoxemic, lethargic and her O2 saturation was 80%.  She was
brought to the ED.  She was in distress, given breathing treatment, however, at
that time, intubation was recommended, but she did not want intubation.  Today,
per my discussion and Dr. Amaya's discussion, the patient wants to be
intubated if needed.  She states she has been complaining of worsening shortness
of breath.  The history was limited because of BiPAP.  Otherwise, other history
was from the records and from the staff.  She has been abusing alcohol, smoking,
having shortness of breath.  When she arrived, she was hypothermic and in severe
respiratory distress.  Arterial blood gas showed acute on chronic hypercapnic
respiratory failure.  There was hypothermia as above, lactic acidosis, lactic
acid was 7, has improved since then.  She is complaining of severe abdominal
pain.  Denies hemoptysis.  She is slightly lethargic, but answering questions.
 
REVIEW OF SYSTEMS:  As above, otherwise the patient is on BiPAP, not able to
provide further history.  She is also hard of hearing.
 
PAST MEDICAL HISTORY:  Alcohol abuse, history of previous CHF, COPD,
hyponatremia, migraine, SVT, hypertension, arthritis, pancreatitis, stroke in
03/2016.
 
ALLERGIES:  CEPHALEXIN.
 
HOME MEDICATIONS:  Noted.
 
FAMILY HISTORY:  Not pertinent.
 
SOCIAL HISTORY:  She continues to smoke and abuse alcohol.
 
PHYSICAL EXAMINATION:
GENERAL:  The patient on BiPAP 16/8.  She is in distress, tachypneic.
 
 
 
[place] [address]                    CONSULTATION                  
_______________________________________________________________________________
 
Name:       MARELY WHITE           Room:           [address]#:  [id number]      Account #:      [id number]  
Admission:  04/19/19     Attend Phys:    DHARMESH Moreno
Discharge:               Date of Birth:  [birth year]  
         Report #: [id number]
                                                                     [id number]  
_______________________________________________________________________________
VITAL SIGNS:  Temperature initially 35.4, now 37.2.  Pulse is 95, respiratory
rate 17, blood pressure 109/81.  O2 saturation is 92% on BiPAP.  Blood gas 7.27,
pCO2 of 50, pO2 of 152 on FiO2 16/6.
HEAD AND NECK:  Supple.  Eyes, nonicteric.
CHEST:  Diffuse wheezing, has barrel chest with hyperinflation, diffusely
decreased breath sounds.
ABDOMEN:  Diffusely tender which is severe.
EXTREMITIES:  No edema.  Pulses equal.
PSYCHIATRIC:  Lethargic, but answering questions properly.  Moving 4
extremities.
 
LABORATORY DATA AND OTHER DATABASE:  Arterial blood gas as above.  White blood
cell count 18.7, platelets 356, hemoglobin is 14.  Tox screen has urine opiates
as well.  Potassium 5.6, creatinine 1.1.  Her troponin was 3.59 and lactate was
7 improved to 2.6.
 
ASSESSMENT AND PLAN:
1.  Severe sepsis.  Her chest CT, which I have reviewed, showed severe
emphysema, right lower lobe infiltrate, minimal right upper lobe infiltrate,
suspect aspiration pneumonia.  Has history of alcohol abuse.  Has abdominal
pain.  At this time, the patient has chronic obstructive pulmonary disease
exacerbation.  Agree with treatment with steroids every 8 hours.  Agree with
Zosyn, Levaquin and we will add vancomycin.
2.  Acute respiratory failure.  Right now, she has acute on chronic hypercapnic
respiratory failure.  Her acidosis is multifactorial related to lactic acidosis
with sepsis, hypoperfusion, hypoxemia as well as chronic obstructive pulmonary
disease.  We will repeat arterial blood gas; have low threshold to intubate the
patient.  Discussed with Dr. Amaya.
3.  Sepsis.  Need to evaluate for intra-abdominal source with associated
vomiting and discussed with Dr. Gallegos who may consider doing CT abdomen.
4.  We will obtain chest x-ray, ABG in the morning.
5.  Myocardial ischemia, inferior lead changes.  Defer to primary/Cardiology.
6.  The patient as above will await for blood gas, make changes on BiPAP.  If
not improving, likely will need to be intubated.
 
This was discussed with RT nursing.
 
Critical care time was 76 minutes.  This was discussed with Dr. Amaya as
well.
 
 
 
 
 
<ELECTRONICALLY SIGNED>
                                        By:  Aga Swanson MD        
04/21/19     1019
D: 04/20/19 1016_______________________________________
T: 04/21/19 0436Asem EDGARDO Swanson MD           /nt

## 2019-04-22 VITALS — SYSTOLIC BLOOD PRESSURE: 119 MMHG | DIASTOLIC BLOOD PRESSURE: 69 MMHG

## 2019-04-22 VITALS — SYSTOLIC BLOOD PRESSURE: 103 MMHG | DIASTOLIC BLOOD PRESSURE: 64 MMHG

## 2019-04-22 LAB
BE: -0.8 MMOL/L
PCO2 BLD: 60.4 MMHG (ref 35–45)
PO2 BLD: 94.2 MMHG (ref 75–100)

## 2019-04-22 NOTE — NUR
INITIAL ASSESSMENT:
Pt evaluated for d/c planning needs.  Reviewed chart and spoke with nurse and
pt's step-son Ugo (DPOA) [phone].  Pt is currently on the ventilator.
Pt was  to her second , Pete, for about 27 years, and he 
about 2 years ago.  DPOA paperwork on chart lists Pete as primary DPOA and
Ugo as secondary.  Ugo said pt is estranged from her biological children
and he was not aware that he was named as DPOA.  According to Ugo, pt lives
alone in 2 bedroom house.  When Pete was alive, he insisted pt go to garage
to smoke, and pt has continued that routine.  Pt lives in the house, and goes
to the garage to smoke.  Ugo said that landlord is not happy with the
current state of the house.  Ugo said he wants to meet with Case Management
to discuss d/c plans.  He does not think pt is safe to return home.  He is
concerned with her alcohol consumption and cigarettes.  According to ICU
nurse, pt was hotlined.  Called [facility].  Will remain available to assist as
needed.

## 2019-04-22 NOTE — NUR
PT TRANSFERED FROM ICU AT 1730 THIS SHIFT. PT EXTUBATED THIS SHIFT AND
TRANSFERED TO COMFORT CARE PREVIOUSLY IN THE SHIFT. PT REMAINS NONRESPONSIVE
WITH AGONAL BREATHING AT A RATE OF 32 WITH NO OTHER VS OBTAINED PER COMFORT
CARE MEASURES. PT HAS 3L PICC LINE AND A RAC FOR IV ACCESS AND IS BEING GIVEN
IV PAIN MEDICATIONS AND IV ANTI ANXIETY MEDICATIONS THIS SHIFT. PT HAS TEMP
SENSING FONSECA CATHETER, O2 SATS OF 99% ON 2L PER NC FOR COMFORT CARE MEASURES.
TURNS ARE BEING DONE WHEN PT NEEDS TO BE CLEANED UP BUT OTHERWISE THE PT
IS BEING ALLOWED TO REST AS PER COMFORT MEASURES. HOURLY ROUNDING
\MAINTAINED THIS SHIFT. WILL CONTINUE TO MONITOR AND ASSESS AT THIS TIME.

## 2019-04-22 NOTE — NUR
MINIMAL PROGRESSION TOWARDS GOALS, HYPOTHERMIC 94.5, YANNICK HUGGER INITATED,
TEMP NORMOTHERIC THIS AM 97.8 CORE TEMP, YANNICK HUGGER REMOVED, REMAINS ON
HEPARIN GTT, HEPARIN SUBTHERAPEUTIC THIS AM APTT SERUM RESULT, HEPARIN BOLUS
1365 UNITS IVP GIVEN AND HEPARIN INFUSION VIA IV PUMP INCREASED 2UNITS/KG/HR.
NO S/S ACTIVE BLEEDING NOTED, MINIMAL YELLOW THIN SECRETIONS SUCTIONED VIA
INLINE ETT, REMAINS ON PROPOFOL AND PRECEDEX GTT FOR SEDATION, OVERBREATHING
VENTILATOR RESP LOW 2O'S WITH AC RATE SET AT 14, B/P STABLE, REMAINS OFF
LEVOPHED ALL SHIFT, NO CHANGE IN VENTILATOR SETTINGS DURING NOC BY RT, FONSECA
PATENT TO DD 850CC URINE OUT, SAFETY MAINTAINED.

## 2019-04-22 NOTE — 2DMMODE
[place] [address]
Phone:  [phone] 2 D/M-MODE ECHOCARDIOGRAM     
_______________________________________________________________________________
 
Name:         MARELY WHITE          Room:          001P    ADM IN 
[facility]#:    [id number]     Account #:     [id number]  
Admission:    19    Attend Phys:   Monique Grover
Discharge:                Date of Birth: [birth year]  
Date of Service: 19 1245  Report #:      [id number]
        [id number]
_______________________________________________________________________________
THIS REPORT FOR:  //name//                      
 
 
--------------- APPROVED REPORT --------------
 
 
Study performed:  2019 09:25:29
 
EXAM: Comprehensive 2D, Doppler, and color-flow 
Echocardiogram 
Patient Location: In-Patient   
Room #:  001     Status:  routine
 
      BSA:         1.48
HR: 132 bpm BP:          107/63 mmHg 
Rhythm: NSR     
 
Other Information 
Study Quality: Adequate
Technically limited study due to  Subcostal views only available 
view.
 
Indications
Acute MI 
Sepsis
 
2D Dimensions
IVSd:  9.24 (7-11mm) LVOT Diam:  20.37 (18-24mm) 
LVDd:  39.42 mm  
PWd:  9.42 (7-11mm) Ascending Ao:  32.82 (22-36mm)
LVDs:  29.02 (25-40mm) 
Aortic Root:  35.46 mm 
 
Volumes
Left Atrial Volume (Systole) 
    LA ESV Index:  23.10 mL/m2
 
Aortic Valve
AoV Peak Hari.:  0.84 m/s 
AO Peak Gr.:  2.84 mmHg  LVOT Max P.00 mmHg
AO Mean Gr.:  1.88 mmHg  LVOT Mean P.16 mmHg
    LVOT Max V:  0.71 m/s
AO V2 VTI:  11.68 cm  LVOT Mean V:  0.51 m/s
CARMINA (VTI):  2.69 cm2  LVOT V1 VTI:  9.63 cm
 
Pulmonary Valve
 
 
[place] [address]
Phone:  [phone]                     2 D/M-MODE ECHOCARDIOGRAM     
_______________________________________________________________________________
 
Name:         MARELY WHITE          Room:          [address] [place] 
.R.#:    [id number]     Account #:     [id number]  
Admission:    19    Attend Phys:   Monique Grover
Discharge:                Date of Birth: [birth year]  
Date of Service: 19 1245  Report #:      [id number]
        [id number]
_______________________________________________________________________________
PV Peak Hari.:  0.66 m/s PV Peak Gr.:  1.76 mmHg
 
Tricuspid Valve
    RAP Estimate:  5.00 mmHg
TR Peak Gr.:  26.18 mmHg RVSP:  31.00 mmHg
    PA Pressure:  31.00 mmHg
 
Left Ventricle
The left ventricle is normal size. There is global hypokinesis of the 
left ventricle. There is normal left ventricular wall thickness. Left 
ventricular systolic function is moderately decreased. LVEF is 
35-40%. This study is not technically sufficient to allow evaluation 
of the LV diastolic function.
 
Right Ventricle
The right ventricle is normal size. The right ventricular systolic 
function is normal.
 
Atria
The left atrium size is normal. The right atrium size is 
normal.
 
Aortic Valve
Mild aortic valve sclerosis. No aortic regurgitation is present. 
There is no aortic valvular stenosis.
 
Mitral Valve
The mitral valve is normal in structure. There is no mitral valve 
regurgitation noted. No evidence of mitral valve stenosis.
 
Tricuspid Valve
The tricuspid valve is normal in structure. Mild tricuspid 
regurgitation. Mild pulmonary hypertension.
 
Pulmonic Valve
The pulmonary valve is normal in structure. Trace pulmonic 
regurgitation.
 
Great Vessels
The aortic root is normal in size. The inferior vena cava is not 
dilated with no inspiratory collapse.
 
Pericardium
There is no pericardial effusion.
 
<Conclusion>
 
 
[place] [address]
Phone:  [phone]                     2 D/M-MODE ECHOCARDIOGRAM     
_______________________________________________________________________________
 
Name:         MARELY WHITE          Room:          [address] [place] 
.R.#:    [id number]     Account #:     [id number]  
Admission:    19    Attend Phys:   Monique Grover
Discharge:                Date of Birth: [birth year]  
Date of Service: 19 1245  Report #:      [id number]
        [id number]
_______________________________________________________________________________
The left ventricle is normal size.
There is normal left ventricular wall thickness.
Left ventricular systolic function is moderately decreased.
LVEF is 35-40%.
There is global hypokinesis of the left ventricle.
Mild tricuspid regurgitation.
Mild pulmonary hypertension.
The inferior vena cava is not  dilated with no inspiratory collapse.
 
 
 
 
 
 
 
 
 
 
 
 
 
 
 
 
 
 
 
 
 
 
 
 
 
 
 
 
 
 
 
 
 
 
 
 
  <ELECTRONICALLY SIGNED>
                                           By: Joe Wray MD, FACC   
  19     1245
D: 19 1245   _____________________________________
T: 19 1245   Joe Wray MD, FAC     /INF

## 2019-04-23 VITALS — SYSTOLIC BLOOD PRESSURE: 183 MMHG | DIASTOLIC BLOOD PRESSURE: 149 MMHG

## 2019-04-23 NOTE — NUR
PT REMAINED UNRESPONSIVE. PT IS ON 3L OF OXYGEN BY NC. IV MORPHIN AND ATIVAN
GIVEN FOR PT COMFORT. NO CHANGE IN CONDITION OVER NIGHT. WILL CONTINUE TO
MONITOR.

## 2019-04-23 NOTE — NUR
ASSUMED CARE OF PATIENT AT APPROX 0730. PATIENT UNRESPONSIVE UPON ASSESSMENT.
COMFORT CARE IN PLACE. PATIENT GIVEN LORAZEPEM AND MORPHINE FOR COMFORT X1
DOSE. PATIENT PASSED AWAY WITH TWO NURSE CONFIRMATION COMPLETED AT 1053.
SUPERVISOR AND PHYSICIAN NOTIFIED. FAMILY CALLED AND CAME TO THE HOSPITAL. [facility]
CONTACTED AT 1130. PATIENTS FONSECA AND PICC REMOVED, BATHED AND PLACED UNDER
COOLING BLANKET. AWAITING RETUNR CALL FROM [facility] AT THIS TIME.

## 2019-04-24 NOTE — NUR
[facility] WAS NOT ABLE TO GET IN CONTACT WITH THE PATINETS FAMILY AND HAS RELEASED
TO BODY TO THE  HOME.  HOME TRANSPORT REMOVED BODY AT 0930.

## 2019-04-24 NOTE — NUR
SPOKE TO STAFF AT [facility] X 2 THIS SHIFT WITH UPDATES. AFTER SEVERAL ATTEMPTS TO
REACH FAMILY OF  WITHOUT SUCCESS, [facility] STATES THEY WILL ATTEMPT AGAIN
EARLY MORNING. INSTRUCTED TO KEEP COOLING BLANKET ON BODY.